# Patient Record
Sex: FEMALE | Race: WHITE | HISPANIC OR LATINO | Employment: FULL TIME | URBAN - METROPOLITAN AREA
[De-identification: names, ages, dates, MRNs, and addresses within clinical notes are randomized per-mention and may not be internally consistent; named-entity substitution may affect disease eponyms.]

---

## 2023-10-14 ENCOUNTER — HOSPITAL ENCOUNTER (EMERGENCY)
Facility: CLINIC | Age: 23
Discharge: HOME | End: 2023-10-14
Attending: EMERGENCY MEDICINE | Admitting: EMERGENCY MEDICINE
Payer: OTHER MISCELLANEOUS

## 2023-10-14 VITALS
TEMPERATURE: 97.7 F | BODY MASS INDEX: 27.31 KG/M2 | OXYGEN SATURATION: 98 % | RESPIRATION RATE: 16 BRPM | HEART RATE: 72 BPM | WEIGHT: 160 LBS | HEIGHT: 64 IN | DIASTOLIC BLOOD PRESSURE: 100 MMHG | SYSTOLIC BLOOD PRESSURE: 148 MMHG
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
## 2023-10-14 DIAGNOSIS — S50.811A ABRASION OF RIGHT FOREARM, INITIAL ENCOUNTER: ICD-10-CM
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
## 2023-10-14 DIAGNOSIS — S06.0XAA CONCUSSION WITH UNKNOWN LOSS OF CONSCIOUSNESS STATUS, INITIAL ENCOUNTER: ICD-10-CM

## 2023-10-14 PROCEDURE — 250N000013 HC RX MED GY IP 250 OP 250 PS 637: Performed by: EMERGENCY MEDICINE

## 2023-10-14 PROCEDURE — 99283 EMERGENCY DEPT VISIT LOW MDM: CPT

## 2023-10-14 RX ORDER — ACETAMINOPHEN 500 MG
1000 TABLET ORAL ONCE
Status: COMPLETED | OUTPATIENT
Start: 2023-10-14 | End: 2023-10-14

## 2023-10-14 RX ADMIN — ACETAMINOPHEN 1000 MG: 500 TABLET, FILM COATED ORAL at 03:39

## 2023-10-14 ASSESSMENT — ACTIVITIES OF DAILY LIVING (ADL): ADLS_ACUITY_SCORE: 35

## 2023-10-14 NOTE — Clinical Note
Lori Mullins was seen and treated in our emergency department on 10/14/2023.  She may return to work on 10/15/2023.       If you have any questions or concerns, please don't hesitate to call.      Ameena Morrell MD
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
## 2023-10-14 NOTE — ED NOTES
Assumed care.    Pt c/o increasing headache and asking for a pain reliever.    Deepika Gamez RN,.......................................... 10/14/2023   3:30 AM

## 2023-10-14 NOTE — DISCHARGE INSTRUCTIONS
Tylenol or ibuprofen as needed for pain.  You can also ice the bump on your head.  For a concussion like this you need to complete brain rest for 24 hours.  After 24 hours you can resume usual activities but do not resume any activities where you may reinjure your head until your symptoms have completely resolved.  Return immediately with worsening headache, more than 1 episode of vomiting, double vision, or any other new or concerning symptoms.  Follow-up with your primary care provider or occupational health provider.  Keep wound clean and dry.
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
## 2024-07-17 ENCOUNTER — ANCILLARY PROCEDURE (OUTPATIENT)
Dept: ULTRASOUND IMAGING | Facility: CLINIC | Age: 24
End: 2024-07-17
Attending: STUDENT IN AN ORGANIZED HEALTH CARE EDUCATION/TRAINING PROGRAM

## 2024-07-17 ENCOUNTER — HOSPITAL ENCOUNTER (EMERGENCY)
Facility: CLINIC | Age: 24
Discharge: HOME | End: 2024-07-17
Attending: STUDENT IN AN ORGANIZED HEALTH CARE EDUCATION/TRAINING PROGRAM | Admitting: STUDENT IN AN ORGANIZED HEALTH CARE EDUCATION/TRAINING PROGRAM
Payer: COMMERCIAL

## 2024-07-17 VITALS
HEIGHT: 64 IN | WEIGHT: 149 LBS | SYSTOLIC BLOOD PRESSURE: 136 MMHG | BODY MASS INDEX: 25.44 KG/M2 | TEMPERATURE: 98.5 F | DIASTOLIC BLOOD PRESSURE: 87 MMHG | RESPIRATION RATE: 18 BRPM | HEART RATE: 80 BPM | OXYGEN SATURATION: 98 %

## 2024-07-17 DIAGNOSIS — L02.419 CELLULITIS AND ABSCESS OF LEG: ICD-10-CM

## 2024-07-17 DIAGNOSIS — L03.119 CELLULITIS AND ABSCESS OF LEG: ICD-10-CM

## 2024-07-17 PROCEDURE — 99284 EMERGENCY DEPT VISIT MOD MDM: CPT | Mod: 25 | Performed by: STUDENT IN AN ORGANIZED HEALTH CARE EDUCATION/TRAINING PROGRAM

## 2024-07-17 PROCEDURE — 76882 US LMTD JT/FCL EVL NVASC XTR: CPT | Mod: RT | Performed by: STUDENT IN AN ORGANIZED HEALTH CARE EDUCATION/TRAINING PROGRAM

## 2024-07-17 PROCEDURE — 76882 US LMTD JT/FCL EVL NVASC XTR: CPT | Mod: 26 | Performed by: STUDENT IN AN ORGANIZED HEALTH CARE EDUCATION/TRAINING PROGRAM

## 2024-07-17 RX ORDER — BUDESONIDE AND FORMOTEROL FUMARATE DIHYDRATE 160; 4.5 UG/1; UG/1
2 AEROSOL RESPIRATORY (INHALATION)
COMMUNITY
Start: 2023-08-30

## 2024-07-17 RX ORDER — CLINDAMYCIN HCL 150 MG
450 CAPSULE ORAL 3 TIMES DAILY
Qty: 45 CAPSULE | Refills: 0 | Status: SHIPPED | OUTPATIENT
Start: 2024-07-17 | End: 2024-07-22

## 2024-07-17 RX ORDER — TESTOSTERONE CYPIONATE 200 MG/ML
VIAL (ML) INTRAMUSCULAR
COMMUNITY
Start: 2024-04-24 | End: 2024-10-25

## 2024-07-17 ASSESSMENT — ACTIVITIES OF DAILY LIVING (ADL): ADLS_ACUITY_SCORE: 35

## 2024-07-17 ASSESSMENT — COLUMBIA-SUICIDE SEVERITY RATING SCALE - C-SSRS
6. HAVE YOU EVER DONE ANYTHING, STARTED TO DO ANYTHING, OR PREPARED TO DO ANYTHING TO END YOUR LIFE?: NO
2. HAVE YOU ACTUALLY HAD ANY THOUGHTS OF KILLING YOURSELF IN THE PAST MONTH?: NO
1. IN THE PAST MONTH, HAVE YOU WISHED YOU WERE DEAD OR WISHED YOU COULD GO TO SLEEP AND NOT WAKE UP?: NO

## 2024-07-17 NOTE — ED PROVIDER NOTES
"ED Provider Note  [facility]      History     Chief Complaint   Patient presents with    Mass     Baseball size mass on the right thigh     HPI  Lori Mullins is a 23 year old female who presents to the emergency department due to mass on right thigh x 1 week with pain, redness, and swelling of his right thigh.    Patient notes that he began having this redness and tenderness in the right anterior thigh.  This [place] progressing over the last 5 to 7 days.  Did not think that there is any injury in the location that he is aware of previously.  However has been noticing more redness and pain today which is why he came in for evaluation.  No shortness of breath.  No chest pain, fevers or chills.    Also incidentally notes that he had another freely mobile nontender lump that he noticed right in front of his right pelvic bone about a month or so ago.  Does not think that this aligned at all with his symptoms that he has been having today.    Of note, patient does use testosterone injections and primarily injects around the right and left lateral thighs, and not in the location of either of the lumps that he notes today.    Past Medical History  No past medical history on file.  No past surgical history on file.  budesonide-formoterol (SYMBICORT) 160-4.5 MCG/ACT Inhaler  clindamycin (CLEOCIN) 150 MG capsule  Testosterone Cypionate 200 MG/ML SOLN      Allergies   Allergen Reactions    Prednisone Swelling     Family History  No family history on file.  Social History       A medically appropriate review of systems was performed with pertinent positives and negatives noted in the HPI, and all other systems negative.    Physical Exam   BP: 136/87  Pulse: 80  Temp: 98.5  F (36.9  C)  Resp: 18  Height: 162.6 cm (5' 4\")  Weight: 67.6 kg (149 lb)  SpO2: 98 %  Physical Exam  GEN: Well appearing, non toxic, cooperative  HEENT: normocephalic and atraumatic, PERRLA, EOMI  CV: well-perfused, " normal skin color for ethnicity  PULM: breathing comfortably, in no respiratory distress  ABD: nondistended  EXT: Full range of motion.  No edema.  NEURO: awake, conversant, grossly normal bilateral upper and lower extremity strength & ROM   SKIN: No rashes, ecchymosis, or lacerations  PSYCH: Calm and cooperative, interactive     ED Course, Procedures, & Data      Procedures  Results for orders placed during the hospital encounter of 07/17/24    POC US SOFT TISSUE    Impression  Limited Soft Tissue Ultrasound, performed and interpreted by me.    Indication:  Skin redness warmth pain. Evaluate for cellulitis vs abscess.    Body location: right lower extremity    Findings:  There is cobblestoning suggestive of cellulitis in the evaluated area. There is a fluid collection measuring 0.25cm to suggest abscess. No foreign body identified    IMPRESSION: Abscess and Cellulitis            Results for orders placed or performed during the hospital encounter of 07/17/24   POC US SOFT TISSUE     Status: None    Impression    Limited Soft Tissue Ultrasound, performed and interpreted by me.    Indication:  Skin redness warmth pain. Evaluate for cellulitis vs abscess.     Body location: right lower extremity    Findings:  There is cobblestoning suggestive of cellulitis in the evaluated area. There is a fluid collection measuring 0.25cm to suggest abscess. No foreign body identified    IMPRESSION: Abscess and Cellulitis             Medications - No data to display  Labs Ordered and Resulted from Time of ED Arrival to Time of ED Departure - No data to display  POC US SOFT TISSUE   Final Result   Limited Soft Tissue Ultrasound, performed and interpreted by me.      Indication:  Skin redness warmth pain. Evaluate for cellulitis vs abscess.       Body location: right lower extremity      Findings:  There is cobblestoning suggestive of cellulitis in the evaluated area. There is a fluid collection measuring 0.25cm to suggest abscess. No  foreign body identified      IMPRESSION: Abscess and Cellulitis                         Critical care was not performed.     Medical Decision Making  The patient's presentation was of moderate complexity (an acute illness with systemic symptoms).    The patient's evaluation involved:  review of external note(s) from 3+ sources (see separate area of note for details)  review of 3+ test result(s) ordered prior to this encounter (see separate area of note for details)  ordering and/or review of 1 test(s) in this encounter (see separate area of note for details)    The patient's management necessitated moderate risk (prescription drug management including medications given in the ED).    Assessment & Plan    23-year-old transgender male presents emergency department due to redness and swelling of his right anterior thigh that is worsened over the last couple of days now tender, swollen and more red than it has been noted to have an area of induration across the anterior thigh that is particularly tender and with some erythema wrapping around the anterior/medial surface.    Clinically most consistent with cellulitis and a very small underlying abscess.  I did perform point-of-care ultrasound with minimal less than 3 mm fluid collection.  We discussed the possibilities including needle aspiration, I&D and antibiotics.  Because of the very small amount of fluid, I do believe that antibiotics are reasonable initial treatment option, but we did talk about I&D today as well.  He strongly agrees to treat with antibiotics initially.  He does have a small, freely movable nontender squishy approximately 5 mm mass above his right anterior inguinal canal.  He notes that this has been there for about a month but he just noticed at the time.  Did consider that this could be lymphadenopathy versus lipoma.      I have reviewed the nursing notes. I have reviewed the findings, diagnosis, plan and need for follow up with the  patient.    Discharge Medication List as of 7/17/2024  1:03 PM        START taking these medications    Details   clindamycin (CLEOCIN) 150 MG capsule Take 3 capsules (450 mg) by mouth 3 times daily for 5 days, Disp-45 capsule, R-0, Local Print             Final diagnoses:   Cellulitis and abscess of leg       Yue Jacques MD  [facility] EMERGENCY DEPARTMENT  7/17/2024     Yue Jacques MD  07/17/24 [id number]

## 2024-07-17 NOTE — ED TRIAGE NOTES
Patient has a baseball size lump on his right thigh. Patient reports it started a week ago but has been getting worse. Patient reports he does testosterone shots on his legs but not in the spot he has the lump. Bump is warm to touch and tender. Patient endorses pain.   Patient tried icing it but it stung.   Patient uses he/him pronouns.

## 2024-07-17 NOTE — DISCHARGE INSTRUCTIONS
You were seen in the emergency department due to an infection in your leg.  This is a skin infection called cellulitis as well as a very small amount of fluid associated with it which is called an abscess.  Because the abscess is so small, we talked about either attempting to drain it here or treating this with antibiotics.  At this point in time we will plan to treat this with antibiotics and watch for any signs of improvement or worsening.  If the redness extends outside of the lines that were drawn today, you do not have resolution of your symptoms by the time your antibiotics are done, if you develop fevers and chills, or any other concerning symptoms we would recommend returning to the emergency department for reevaluation.  Otherwise please take your antibiotics until they are completely gone.

## 2024-07-18 ENCOUNTER — HOSPITAL ENCOUNTER (EMERGENCY)
Facility: CLINIC | Age: 24
End: 2024-07-18